# Patient Record
Sex: MALE
[De-identification: names, ages, dates, MRNs, and addresses within clinical notes are randomized per-mention and may not be internally consistent; named-entity substitution may affect disease eponyms.]

---

## 2018-09-26 ENCOUNTER — HOSPITAL ENCOUNTER (OUTPATIENT)
Dept: HOSPITAL 42 - CATH | Age: 73
Discharge: HOME | End: 2018-09-26
Attending: INTERNAL MEDICINE
Payer: MEDICARE

## 2018-09-26 VITALS — DIASTOLIC BLOOD PRESSURE: 60 MMHG | SYSTOLIC BLOOD PRESSURE: 126 MMHG | HEART RATE: 59 BPM

## 2018-09-26 VITALS — RESPIRATION RATE: 20 BRPM | OXYGEN SATURATION: 99 %

## 2018-09-26 VITALS — BODY MASS INDEX: 24.5 KG/M2

## 2018-09-26 VITALS — TEMPERATURE: 98 F

## 2018-09-26 DIAGNOSIS — I10: ICD-10-CM

## 2018-09-26 DIAGNOSIS — R06.09: ICD-10-CM

## 2018-09-26 DIAGNOSIS — I25.10: Primary | ICD-10-CM

## 2018-09-26 PROCEDURE — 99152 MOD SED SAME PHYS/QHP 5/>YRS: CPT

## 2018-09-26 PROCEDURE — 36415 COLL VENOUS BLD VENIPUNCTURE: CPT

## 2018-09-26 PROCEDURE — 86900 BLOOD TYPING SEROLOGIC ABO: CPT

## 2018-09-26 PROCEDURE — 86850 RBC ANTIBODY SCREEN: CPT

## 2018-09-26 PROCEDURE — 93458 L HRT ARTERY/VENTRICLE ANGIO: CPT

## 2018-09-26 PROCEDURE — 99153 MOD SED SAME PHYS/QHP EA: CPT

## 2018-09-26 NOTE — CARDCATH
PROCEDURE DATE:  09/26/2018



PROCEDURES:

1.  Selective left and right coronary angiography.

2.  Left ventriculography.

3.  Right femoral arteriography.

4.  Angio-Seal deployment.



HISTORY:  This is a 72-year-old male with known coronary disease, who has

had recent worsening exertional dyspnea.  Due to the symptoms, a cardiac

catheterization was advised.



INDICATION:  Exertional dyspnea, known coronary disease.



FINDINGS:



HEMODYNAMICS:  The aortic pressure was 130/70.  Left ventricular pressure

was 113/16.



CORONARY ANATOMY:

1.  The left main stem was long and normal.

2.  The left anterior descending artery had moderate calcification

proximally and evidence of a diffuse 40% to 50% tapering present.  The

diagonal branches had evidence of minimal disease.

3.  The left circumflex artery was of moderate size and dominant.  The

obtuse marginal branches were fairly small in caliber.  This proximal

circumflex had mild tapering, but no high-grade stenosis.

4.  The right coronary artery was small and nondominant.



LEFT VENTRICULOGRAPHY:  A hand injection was performed on the left

ventricle revealing a relatively normal wall motion with an ejection

fraction of 55%.  There was no aortic valve gradient on catheter pullback. 

Mitral regurgitation was not assessed.



RIGHT FEMORAL ARTERIOGRAPHY:  The right femoral arteriogram was performed

in the area of projection.  This revealed no evidence of significant

disease and appropriate level of arterial puncture.  The puncture site was

then closed with deployment of an Angio-Seal device.



CONCLUSION:

1.  Mild-to-moderate diffuse proximal left anterior descending disease.

2.  Preserved left ventricular systolic function.



RECOMMENDATIONS:  Given the above findings, continued risk factor control

appeared most appropriate.







__________________________________________

Ramy Glover MD





__________________________________________

 (Delete this signature block when dictator is a preceptor.)



cc:  Name, MD (Delete if not dictated.)



DD:  09/26/2018 12:44:00

DT:  09/26/2018 13:36:11

Job # [id number]